# Patient Record
Sex: MALE | Race: BLACK OR AFRICAN AMERICAN | URBAN - METROPOLITAN AREA
[De-identification: names, ages, dates, MRNs, and addresses within clinical notes are randomized per-mention and may not be internally consistent; named-entity substitution may affect disease eponyms.]

---

## 2017-10-09 ENCOUNTER — OFFICE VISIT (OUTPATIENT)
Dept: URGENT CARE | Facility: URGENT CARE | Age: 52
End: 2017-10-09
Payer: COMMERCIAL

## 2017-10-09 ENCOUNTER — RADIANT APPOINTMENT (OUTPATIENT)
Dept: GENERAL RADIOLOGY | Facility: CLINIC | Age: 52
End: 2017-10-09
Attending: FAMILY MEDICINE
Payer: COMMERCIAL

## 2017-10-09 VITALS
DIASTOLIC BLOOD PRESSURE: 50 MMHG | RESPIRATION RATE: 20 BRPM | WEIGHT: 173 LBS | HEART RATE: 60 BPM | SYSTOLIC BLOOD PRESSURE: 100 MMHG | OXYGEN SATURATION: 99 % | HEIGHT: 69 IN | BODY MASS INDEX: 25.62 KG/M2

## 2017-10-09 DIAGNOSIS — S99.922A INJURY OF LEFT FOOT, INITIAL ENCOUNTER: Primary | ICD-10-CM

## 2017-10-09 PROCEDURE — 73630 X-RAY EXAM OF FOOT: CPT | Mod: LT

## 2017-10-09 PROCEDURE — 99213 OFFICE O/P EST LOW 20 MIN: CPT | Performed by: FAMILY MEDICINE

## 2017-10-09 RX ORDER — NAPROXEN 500 MG/1
500 TABLET ORAL 2 TIMES DAILY WITH MEALS
Qty: 14 TABLET | Refills: 0 | Status: SHIPPED | OUTPATIENT
Start: 2017-10-09 | End: 2017-10-16

## 2017-10-09 ASSESSMENT — PAIN SCALES - GENERAL: PAINLEVEL: EXTREME PAIN (8)

## 2017-10-09 NOTE — MR AVS SNAPSHOT
"              After Visit Summary   10/9/2017    Fernando Bui Jr.    MRN: [id number]           Patient Information     Date Of Birth          1965        Visit Information        Provider Department      10/9/2017 9:00 AM Cali Candelario,  [facility]        Today's Diagnoses     Injury of left foot, initial encounter    -  1       Follow-ups after your visit        Who to contact     If you have questions or need follow up information about today's clinic visit or your schedule please contact [facility] directly at [phone].  Normal or non-critical lab and imaging results will be communicated to you by Marblarhart, letter or phone within 4 business days after the clinic has received the results. If you do not hear from us within 7 days, please contact the clinic through Marblarhart or phone. If you have a critical or abnormal lab result, we will notify you by phone as soon as possible.  Submit refill requests through [employer] Commons or call your pharmacy and they will forward the refill request to us. Please allow 3 business days for your refill to be completed.          Additional Information About Your Visit        MyChart Information     [employer] lets you send messages to your doctor, view your test results, renew your prescriptions, schedule appointments and more. To sign up, go to [email or web address] . Click on \"Log in\" on the left side of the screen, which will take you to the Welcome page. Then click on \"Sign up Now\" on the right side of the page.     You will be asked to enter the access code listed below, as well as some personal information. Please follow the directions to create your username and password.     Your access code is: [email or web address]  Expires: 2018 10:02 AM     Your access code will  in 90 days. If you need help or a new code, please call your [place] clinic or [phone].        Care EveryWhere ID     This is your " "Care EveryWhere ID. This could be used by other organizations to access your [place] medical records  [id number]        Your Vitals Were     Pulse Respirations Height Pulse Oximetry BMI (Body Mass Index)       60 20 5' 9\" (1.753 m) 99% 25.55 kg/m2        Blood Pressure from Last 3 Encounters:   10/09/17 100/50   10/24/14 94/60   10/13/14 124/74    Weight from Last 3 Encounters:   10/09/17 173 lb (78.5 kg)   10/24/14 174 lb (78.9 kg)   10/13/14 174 lb 1.6 oz (79 kg)              We Performed the Following     XR Foot Left G/E 3 Views          Today's Medication Changes          These changes are accurate as of: 10/9/17 10:02 AM.  If you have any questions, ask your nurse or doctor.               Start taking these medicines.        Dose/Directions    naproxen 500 MG tablet   Commonly known as:  NAPROSYN   Used for:  Injury of left foot, initial encounter   Started by:  Cali Candelario DO        Dose:  500 mg   Take 1 tablet (500 mg) by mouth 2 times daily (with meals) for 7 days   Quantity:  14 tablet   Refills:  0            Where to get your medicines      These medications were sent to [facility] [address] [address]     Phone:  [phone]     naproxen 500 MG tablet                Primary Care Provider    None Specified       No primary provider on file.        Equal Access to Services     JOSÉ PLASCENCIA AH: Hadii rasta martinezo Sojuan, waaxda luqadaha, qaybta kaalmada adeegyada, mian yadav. So [facility] [phone].    ATENCIÓN: Si habla español, tiene a winston disposición servicios gratuitos de asistencia lingüística. Micah al [phone].    We comply with applicable federal civil rights laws and [place] laws. We do not discriminate on the basis of race, color, national origin, age, disability, sex, sexual orientation, or gender identity.            Thank you!     Thank you for choosing [facility] " [facility]  for your care. Our goal is always to provide you with excellent care. Hearing back from our patients is one way we can continue to improve our services. Please take a few minutes to complete the written survey that you may receive in the mail after your visit with us. Thank you!             Your Updated Medication List - Protect others around you: Learn how to safely use, store and throw away your medicines at www.disposemymeds.org.          This list is accurate as of: 10/9/17 10:02 AM.  Always use your most recent med list.                   Brand Name Dispense Instructions for use Diagnosis    naproxen 500 MG tablet    NAPROSYN    14 tablet    Take 1 tablet (500 mg) by mouth 2 times daily (with meals) for 7 days    Injury of left foot, initial encounter

## 2017-10-09 NOTE — PROGRESS NOTES
"SUBJECTIVE:  Chief Complaint   Patient presents with     Toe Injury     DOI thursday left big toe   .ident presents with a chief complaint of left foot.  The injury occurred 4 days ago.   The injury happened while while walking.   How: trauma:  immediate pain  The patient complained of moderate pain and has had decreased ROM.    Pain exacerbated by weight-bearing and movement    He treated it initially with ice.   This is the first time this type of injury has occurred to this patient.     Past Medical History:   Diagnosis Date     Hyperlipidemia LDL goal <160 9/5/2013     Tobacco abuse 9/16/2013       No past surgical history on file.    Family History   Problem Relation Age of Onset     DIABETES Maternal Grandmother      CANCER Father        Social History   Substance Use Topics     Smoking status: Current Every Day Smoker     Packs/day: 2.00     Types: Cigarettes     Smokeless tobacco: Not on file     Alcohol use Yes      Comment: social     ROSINTEGUMENTARY/SKIN: NEGATIVE for open wound/bleeding and NEGATIVE for bruising  MUSCULOSKELETAL: NEGATIVE for joint swelling, paresthesias, radicular pain    EXAM:/50 (BP Location: Right arm, Patient Position: Sitting, Cuff Size: Adult Regular)  Pulse 60  Resp 20  Ht 5' 9\" (1.753 m)  Wt 173 lb (78.5 kg)  SpO2 99%  BMI 25.55 kg/m2 Gen: healthy,alert,no distress  Extremity: foot has pain with palpation.   There is not compromise to the distal circulation.  Pulses are +2 and CRT is brisk.GENERAL APPEARANCE: healthy, alert and no distress  EXTREMITIES: peripheral pulses normal  SKIN: no suspicious lesions or rashes  NEURO: Normal strength and tone, sensory exam grossly normal, mentation intact and speech normal    Xray without acute findings, read by Cali Candelario D.O.      ICD-10-CM    1. Injury of left foot, initial encounter S99.922A XR Foot Left G/E 3 Views     naproxen (NAPROSYN) 500 MG tablet         RICE    "

## 2017-10-09 NOTE — NURSING NOTE
"Chief Complaint   Patient presents with     Toe Injury     DOI thursday left big toe       Initial /50 (BP Location: Right arm, Patient Position: Sitting, Cuff Size: Adult Regular)  Pulse 60  Resp 20  Ht 5' 9\" (1.753 m)  Wt 173 lb (78.5 kg)  SpO2 99%  BMI 25.55 kg/m2 Estimated body mass index is 25.55 kg/(m^2) as calculated from the following:    Height as of this encounter: 5' 9\" (1.753 m).    Weight as of this encounter: 173 lb (78.5 kg).  Medication Reconciliation: complete   Radha Agarwal, Medical Assistant      "

## 2017-10-26 ENCOUNTER — OFFICE VISIT (OUTPATIENT)
Dept: URGENT CARE | Facility: URGENT CARE | Age: 52
End: 2017-10-26
Payer: COMMERCIAL

## 2017-10-26 VITALS
SYSTOLIC BLOOD PRESSURE: 110 MMHG | WEIGHT: 176 LBS | HEART RATE: 56 BPM | RESPIRATION RATE: 16 BRPM | DIASTOLIC BLOOD PRESSURE: 70 MMHG | TEMPERATURE: 98.8 F | BODY MASS INDEX: 25.99 KG/M2

## 2017-10-26 DIAGNOSIS — L03.90 CELLULITIS, UNSPECIFIED CELLULITIS SITE: ICD-10-CM

## 2017-10-26 DIAGNOSIS — R21 RASH: Primary | ICD-10-CM

## 2017-10-26 PROCEDURE — 99213 OFFICE O/P EST LOW 20 MIN: CPT | Performed by: FAMILY MEDICINE

## 2017-10-26 RX ORDER — CEPHALEXIN 500 MG/1
500 CAPSULE ORAL 3 TIMES DAILY
Qty: 30 CAPSULE | Refills: 0 | Status: SHIPPED | OUTPATIENT
Start: 2017-10-26 | End: 2017-11-05

## 2017-10-26 RX ORDER — TRIAMCINOLONE ACETONIDE 1 MG/G
CREAM TOPICAL
Qty: 80 G | Refills: 0 | Status: SHIPPED | OUTPATIENT
Start: 2017-10-26 | End: 2017-11-09

## 2017-10-26 NOTE — MR AVS SNAPSHOT
"              After Visit Summary   10/26/2017    Fernando Bui Jr.    MRN: [id number]           Patient Information     Date Of Birth          1965        Visit Information        Provider Department      10/26/2017 12:45 PM Cali Candelario DO [facility]        Today's Diagnoses     Rash    -  1    Cellulitis, unspecified cellulitis site           Follow-ups after your visit        Who to contact     If you have questions or need follow up information about today's clinic visit or your schedule please contact [facility] directly at [phone].  Normal or non-critical lab and imaging results will be communicated to you by Twibingohart, letter or phone within 4 business days after the clinic has received the results. If you do not hear from us within 7 days, please contact the clinic through Twibingohart or phone. If you have a critical or abnormal lab result, we will notify you by phone as soon as possible.  Submit refill requests through ClubKviar or call your pharmacy and they will forward the refill request to us. Please allow 3 business days for your refill to be completed.          Additional Information About Your Visit        MyChart Information     ClubKviar lets you send messages to your doctor, view your test results, renew your prescriptions, schedule appointments and more. To sign up, go to [email or web address] . Click on \"Log in\" on the left side of the screen, which will take you to the Welcome page. Then click on \"Sign up Now\" on the right side of the page.     You will be asked to enter the access code listed below, as well as some personal information. Please follow the directions to create your username and password.     Your access code is: [email or web address]  Expires: 2018 10:02 AM     Your access code will  in 90 days. If you need help or a new code, please call your [place] clinic or [phone].        Care EveryWhere ID     " This is your Care EveryWhere ID. This could be used by other organizations to access your [place] medical records  [id number]        Your Vitals Were     Pulse Temperature Respirations BMI (Body Mass Index)          56 98.8  F (37.1  C) 16 25.99 kg/m2         Blood Pressure from Last 3 Encounters:   10/26/17 110/70   10/09/17 100/50   10/24/14 94/60    Weight from Last 3 Encounters:   10/26/17 176 lb (79.8 kg)   10/09/17 173 lb (78.5 kg)   10/24/14 174 lb (78.9 kg)              Today, you had the following     No orders found for display         Today's Medication Changes          These changes are accurate as of: 10/26/17  1:46 PM.  If you have any questions, ask your nurse or doctor.               Start taking these medicines.        Dose/Directions    cephALEXin 500 MG capsule   Commonly known as:  KEFLEX   Used for:  Cellulitis, unspecified cellulitis site   Started by:  Cali Candelario DO        Dose:  500 mg   Take 1 capsule (500 mg) by mouth 3 times daily for 10 days   Quantity:  30 capsule   Refills:  0       triamcinolone 0.1 % cream   Commonly known as:  KENALOG   Used for:  Rash   Started by:  Cali Candelario DO        Apply sparingly to affected area three times daily as needed   Quantity:  80 g   Refills:  0            Where to get your medicines      These medications were sent to [facility] [address] [address]     Phone:  [phone]     cephALEXin 500 MG capsule    triamcinolone 0.1 % cream                Primary Care Provider    Physician No Ref-Primary       NO REF-PRIMARY PHYSICIAN        Equal Access to Services     [facility] AH: Hadii rasta rahman hadasho Soomaali, waaxda luqadaha, qaybta kaalmada adeegyada, mian yadav. So [facility] [phone].    ATENCIÓN: Si habla español, tiene a winston disposición servicios gratuitos de asistencia lingüística. Llame al [phone].    We comply with  applicable federal civil rights laws and [place] laws. We do not discriminate on the basis of race, color, national origin, age, disability, sex, sexual orientation, or gender identity.            Thank you!     Thank you for choosing [facility]  for your care. Our goal is always to provide you with excellent care. Hearing back from our patients is one way we can continue to improve our services. Please take a few minutes to complete the written survey that you may receive in the mail after your visit with us. Thank you!             Your Updated Medication List - Protect others around you: Learn how to safely use, store and throw away your medicines at www.disposemymeds.org.          This list is accurate as of: 10/26/17  1:46 PM.  Always use your most recent med list.                   Brand Name Dispense Instructions for use Diagnosis    cephALEXin 500 MG capsule    KEFLEX    30 capsule    Take 1 capsule (500 mg) by mouth 3 times daily for 10 days    Cellulitis, unspecified cellulitis site       triamcinolone 0.1 % cream    KENALOG    80 g    Apply sparingly to affected area three times daily as needed    Rash

## 2017-10-26 NOTE — PROGRESS NOTES
SUBJECTIVE:Fernando Bui Jr. is a 52 year old male who presents to the clinic today for a rash.  Onset of rash was week(s) ago.   Rash is still present.   Location of the rash: generalized.  Associated symptoms include: itching.    Symptoms are moderate and rash seems to be worsening.  Therapies tried to improve the rash: otc.  Previous history of a similar rash? No  Recent exposure history: none known    Past Medical History:   Diagnosis Date     Hyperlipidemia LDL goal <160 9/5/2013     Tobacco abuse 9/16/2013     Allergies   Allergen Reactions     No Clinical Screening - See Comments      detergent     Social History   Substance Use Topics     Smoking status: Current Every Day Smoker     Packs/day: 2.00     Types: Cigarettes     Smokeless tobacco: Never Used      Comment: 8 cigarettes a day     Alcohol use Yes      Comment: social       ROS:CONSTITUTIONAL:NEGATIVE for fever, chills, change in weight    EXAM: VITALS: /70  Pulse 56  Temp 98.8  F (37.1  C)  Resp 16  Wt 176 lb (79.8 kg)  BMI 25.99 kg/m2  General:healthy,alert,no distress    Location: generalized     Distribution: diffuse/patchy     Lesion grouping: bilateral     Lesion type: macular     Color: skin color with no other findingsPERTINENT EXAM: GENERAL APPEARANCE: healthy, alert and no distress      ICD-10-CM    1. Rash R21 triamcinolone (KENALOG) 0.1 % cream   2. Cellulitis, unspecified cellulitis site L03.90 cephALEXin (KEFLEX) 500 MG capsule       Follow-up with primary clinic if not improving

## 2017-10-26 NOTE — NURSING NOTE
"Chief Complaint   Patient presents with     Derm Problem     Pt c/o bumps on his hands and abdomen X 2 weeks. Pt reports itching.     Urgent Care       Initial /70  Pulse 56  Temp 98.8  F (37.1  C)  Resp 16  Wt 176 lb (79.8 kg)  BMI 25.99 kg/m2 Estimated body mass index is 25.99 kg/(m^2) as calculated from the following:    Height as of 10/9/17: 5' 9\" (1.753 m).    Weight as of this encounter: 176 lb (79.8 kg).  Medication Reconciliation: complete    "

## 2017-11-29 ENCOUNTER — OFFICE VISIT (OUTPATIENT)
Dept: URGENT CARE | Facility: URGENT CARE | Age: 52
End: 2017-11-29
Payer: COMMERCIAL

## 2017-11-29 VITALS
SYSTOLIC BLOOD PRESSURE: 112 MMHG | HEART RATE: 69 BPM | TEMPERATURE: 98 F | BODY MASS INDEX: 25.84 KG/M2 | DIASTOLIC BLOOD PRESSURE: 80 MMHG | WEIGHT: 175 LBS

## 2017-11-29 DIAGNOSIS — R21 RASH: Primary | ICD-10-CM

## 2017-11-29 PROCEDURE — 99213 OFFICE O/P EST LOW 20 MIN: CPT | Performed by: FAMILY MEDICINE

## 2017-11-29 RX ORDER — PERMETHRIN 50 MG/G
CREAM TOPICAL
Qty: 60 G | Refills: 1 | Status: SHIPPED | OUTPATIENT
Start: 2017-11-29 | End: 2017-12-29

## 2017-11-29 NOTE — PATIENT INSTRUCTIONS
Scabies  Scabies is a skin infection. It is caused by a tiny parasitic insect, or mite, that is too small to see directly. It can be seen under a microscope, but it is usually recognized only by the rash and symptoms it causes. This can make it hard to diagnose since the signs and symptoms can be similar to other diseases.  The scabies mite tunnels under the skin. It creates a small burrow, where it leaves its eggs. These eggs castellanos and grow into adults. They then create new burrows over the next 1 to 2 weeks. The mites die in about 4 to 6 weeks. The rash and itching are caused by an allergic reaction to the scabies saliva or feces.  Scabies is highly contagious. It is spread by direct skin contact. It is easily spread by close personal contact, sexual contact, or by sharing bed linens or clothing used by an infected person.  It may take 4 to 6 weeks for symptoms to appear after being exposed. Everyone living in the house with you, as well as your sexual partners, should be treated at the same time. After the first treatment, you will no longer be contagious. You may return to work, school or .  Home care    Machine wash in hot water all sheets, towels, pillowcases, underwear, pajamas, and any other clothing you have worn lately. Use the hot cycle of a dryer or use a hot iron to sterilize.    Seal anything that is hard to wash in a plastic trash bag for 4 days. This includes coats, jackets, blankets, and bedspreads. (The insects die after 3 days off the human body.)  Medicines  Scabicides  Medicines used to treat scabies are called scabicides. These are creams that kill the scabies mites. A prescription is needed. When using these medicines:    Always follow instructions provided by your healthcare provider and pharmacist. Also follow the printed instructions that come with the medicine.    Talk with your provider about precautions to take when using these medicines.    Use the cream on your body when your  skin is cool and dry. Don t use it after a hot shower or bath.    Usually the cream is put on your whole body. This means from your chin all the way down to your toes. Scabies does not usually affect an adult s head. So cream is not needed there. For children, discuss this with your child s provider.    Leave the cream or lotion on for the recommended amount of time. This is usually 8 to 12 hours.    Don t leave cream or lotion on your skin longer than directed. Don t use more than recommended.    Clean clothes should be worn after the treatment.    If you wash your hands after using the cream, you will need to reapply the cream to your hands.    If you are breastfeeding, wash off your nipples before feeding. Then reapply the cream after breastfeeding.    For babies or infants, put mittens on their hands. This will stop them from licking the cream or lotion. It will also stop them from scratching themselves because of the itching.  Other medicines    An oral medicine called ivermectin may be prescribed for severe cases. It may also be used if you can t apply creams.    Itching may cause the most discomfort. If large areas of your skin are affected, over-the-counter antihistamines may be used to reduce itching. Or you may be given a prescription antihistamine. Some of these medicines may make you sleepy. They are best used at bedtime. Antihistamines that don t make you sleepy can be used during the day. Note: Don t use medicine that has diphenhydramine if you have glaucoma, or if you are a man who has trouble urinating due to an enlarged prostate.    If you were given antibiotics due to a bacterial infection, take them until they are finished. It is important to finish the antibiotics even if the wound looks better. This is to make sure the infection has cleared.  Follow-up care  Follow up with your healthcare provider, or as advised. Call your provider if your symptoms don t improve after 1 week, or if new burrows  or rashes appear.  When to seek medical advice  Call your healthcare provider right away if any of these occur:    Yellow-brown crusts or drainage from the sores    Other signs of infection, including increasing redness, swelling, pain, or pus    Fever of 100.4 F (38 C) or higher, or as directed by your provider  Date Last Reviewed: 8/1/2016 2000-2017 [employer]. [address] [place] [address]. All rights reserved. This information is not intended as a substitute for professional medical care. Always follow your healthcare professional's instructions.

## 2017-11-29 NOTE — NURSING NOTE
"Chief Complaint   Patient presents with     Derm Problem     itchy rash over various parts of body for a few weeks.       Initial /80  Pulse 69  Temp 98  F (36.7  C) (Oral)  Wt 175 lb (79.4 kg)  BMI 25.84 kg/m2 Estimated body mass index is 25.84 kg/(m^2) as calculated from the following:    Height as of 10/9/17: 5' 9\" (1.753 m).    Weight as of this encounter: 175 lb (79.4 kg).  Medication Reconciliation: complete    "

## 2017-11-29 NOTE — PROGRESS NOTES
SUBJECTIVE:Fernando Bui Jr. is a 52 year old male who presents to the clinic today for a rash.  Onset of rash was 1 week(s) ago.   Rash is still present.   Location of the rash: generalized.  Associated symptoms include: itching.    Symptoms are moderate and rash seems to be worsening.  Therapies tried to improve the rash: none.  Previous history of a similar rash? No  Recent exposure history: none known    Past Medical History:   Diagnosis Date     Hyperlipidemia LDL goal <160 9/5/2013     Tobacco abuse 9/16/2013     Allergies   Allergen Reactions     No Clinical Screening - See Comments      detergent     Social History   Substance Use Topics     Smoking status: Current Every Day Smoker     Packs/day: 2.00     Types: Cigarettes     Smokeless tobacco: Never Used      Comment: 8 cigarettes a day     Alcohol use Yes      Comment: social       ROS:CONSTITUTIONAL:NEGATIVE for fever, chills, change in weight    EXAM: VITALS: /80  Pulse 69  Temp 98  F (36.7  C) (Oral)  Wt 175 lb (79.4 kg)  BMI 25.84 kg/m2  General:healthy,alert,no distress    Location: generalized     Distribution: diffuse/patchy     Lesion grouping: bilateral     Lesion type: macular     Color: red with no other findingsPERTINENT EXAM: GENERAL APPEARANCE: healthy, alert and no distress      ICD-10-CM    1. Rash R21 permethrin (ELIMITE) 5 % cream     DERMATOLOGY REFERRAL       Follow-up with primary clinic if not improving

## 2017-11-29 NOTE — MR AVS SNAPSHOT
After Visit Summary   11/29/2017    Fernando Bui Jr.    MRN: [id number]           Patient Information     Date Of Birth          1965        Visit Information        Provider Department      11/29/2017 9:15 AM Cali Candelario DO [facility]        Today's Diagnoses     Rash    -  1      Care Instructions      Scabies  Scabies is a skin infection. It is caused by a tiny parasitic insect, or mite, that is too small to see directly. It can be seen under a microscope, but it is usually recognized only by the rash and symptoms it causes. This can make it hard to diagnose since the signs and symptoms can be similar to other diseases.  The scabies mite tunnels under the skin. It creates a small burrow, where it leaves its eggs. These eggs castellanos and grow into adults. They then create new burrows over the next 1 to 2 weeks. The mites die in about 4 to 6 weeks. The rash and itching are caused by an allergic reaction to the scabies saliva or feces.  Scabies is highly contagious. It is spread by direct skin contact. It is easily spread by close personal contact, sexual contact, or by sharing bed linens or clothing used by an infected person.  It may take 4 to 6 weeks for symptoms to appear after being exposed. Everyone living in the house with you, as well as your sexual partners, should be treated at the same time. After the first treatment, you will no longer be contagious. You may return to work, school or .  Home care    Machine wash in hot water all sheets, towels, pillowcases, underwear, pajamas, and any other clothing you have worn lately. Use the hot cycle of a dryer or use a hot iron to sterilize.    Seal anything that is hard to wash in a plastic trash bag for 4 days. This includes coats, jackets, blankets, and bedspreads. (The insects die after 3 days off the human body.)  Medicines  Scabicides  Medicines used to treat scabies are called scabicides.  These are creams that kill the scabies mites. A prescription is needed. When using these medicines:    Always follow instructions provided by your healthcare provider and pharmacist. Also follow the printed instructions that come with the medicine.    Talk with your provider about precautions to take when using these medicines.    Use the cream on your body when your skin is cool and dry. Don t use it after a hot shower or bath.    Usually the cream is put on your whole body. This means from your chin all the way down to your toes. Scabies does not usually affect an adult s head. So cream is not needed there. For children, discuss this with your child s provider.    Leave the cream or lotion on for the recommended amount of time. This is usually 8 to 12 hours.    Don t leave cream or lotion on your skin longer than directed. Don t use more than recommended.    Clean clothes should be worn after the treatment.    If you wash your hands after using the cream, you will need to reapply the cream to your hands.    If you are breastfeeding, wash off your nipples before feeding. Then reapply the cream after breastfeeding.    For babies or infants, put mittens on their hands. This will stop them from licking the cream or lotion. It will also stop them from scratching themselves because of the itching.  Other medicines    An oral medicine called ivermectin may be prescribed for severe cases. It may also be used if you can t apply creams.    Itching may cause the most discomfort. If large areas of your skin are affected, over-the-counter antihistamines may be used to reduce itching. Or you may be given a prescription antihistamine. Some of these medicines may make you sleepy. They are best used at bedtime. Antihistamines that don t make you sleepy can be used during the day. Note: Don t use medicine that has diphenhydramine if you have glaucoma, or if you are a man who has trouble urinating due to an enlarged prostate.    If  you were given antibiotics due to a bacterial infection, take them until they are finished. It is important to finish the antibiotics even if the wound looks better. This is to make sure the infection has cleared.  Follow-up care  Follow up with your healthcare provider, or as advised. Call your provider if your symptoms don t improve after 1 week, or if new burrows or rashes appear.  When to seek medical advice  Call your healthcare provider right away if any of these occur:    Yellow-brown crusts or drainage from the sores    Other signs of infection, including increasing redness, swelling, pain, or pus    Fever of 100.4 F (38 C) or higher, or as directed by your provider  Date Last Reviewed: 8/1/2016 2000-2017 [employer]. [address] [place] [address], [place] [address]. All rights reserved. This information is not intended as a substitute for professional medical care. Always follow your healthcare professional's instructions.                Follow-ups after your visit        Additional Services     DERMATOLOGY REFERRAL       Your provider has referred you to: [facility]: [facility] [phone]   [email or web address]  UM: D[facility]ology Clinic [facility] [phone]   [email or web address]  N: [facility] [phone]   [email or web address]  FHN: [facility] [place] [phone]  [email or web address]  FHN: [facility] [phone]   [email or web address]  [place] [phone]   [email or web address]  FHN: [facility] [place] [phone]   [email or web address]  N: [facility] [phone]   [email or web address]    Please be aware that coverage of these services is subject to the terms and limitations of your health insurance plan.  Call  "member services at your health plan with any benefit or coverage questions.      Please bring the following with you to your appointment:    (1) Any X-Rays, CTs or MRIs which have been performed.  Contact the facility where they were done to arrange for  prior to your scheduled appointment.    (2) List of current medications  (3) This referral request   (4) Any documents/labs given to you for this referral                  Who to contact     If you have questions or need follow up information about today's clinic visit or your schedule please contact [facility] directly at [phone].  Normal or non-critical lab and imaging results will be communicated to you by MyChart, letter or phone within 4 business days after the clinic has received the results. If you do not hear from us within 7 days, please contact the clinic through [employer] or phone. If you have a critical or abnormal lab result, we will notify you by phone as soon as possible.  Submit refill requests through [employer] or call your pharmacy and they will forward the refill request to us. Please allow 3 business days for your refill to be completed.          Additional Information About Your Visit        MyChart Information     [employer] lets you send messages to your doctor, view your test results, renew your prescriptions, schedule appointments and more. To sign up, go to [email or web address] . Click on \"Log in\" on the left side of the screen, which will take you to the Welcome page. Then click on \"Sign up Now\" on the right side of the page.     You will be asked to enter the access code listed below, as well as some personal information. Please follow the directions to create your username and password.     Your access code is: [email or web address]  Expires: 2018  9:02 AM     Your access code will  in 90 days. If you need help or a new code, please call your [place] clinic or [phone].        Care EveryWhere ID "     This is your Care EveryWhere ID. This could be used by other organizations to access your [facility] medical records  [id number]        Your Vitals Were     Pulse Temperature BMI (Body Mass Index)             69 98  F (36.7  C) (Oral) 25.84 kg/m2          Blood Pressure from Last 3 Encounters:   11/29/17 112/80   10/26/17 110/70   10/09/17 100/50    Weight from Last 3 Encounters:   11/29/17 175 lb (79.4 kg)   10/26/17 176 lb (79.8 kg)   10/09/17 173 lb (78.5 kg)              We Performed the Following     DERMATOLOGY REFERRAL          Today's Medication Changes          These changes are accurate as of: 11/29/17  9:47 AM.  If you have any questions, ask your nurse or doctor.               Start taking these medicines.        Dose/Directions    permethrin 5 % cream   Commonly known as:  ELIMITE   Used for:  Rash   Started by:  Cali Candelario, DO        Apply cream from head to toe (except the face); leave on for 8-14 hours then wash off with water; reapply in 1 week if live mites appear.   Quantity:  60 g   Refills:  1            Where to get your medicines      These medications were sent to [facility] [place] [address]     Phone:  [phone]     permethrin 5 % cream                Primary Care Provider Fax #    Physician No Ref-Primary [phone]       No address on file        Equal Access to Services     JOSÉ PLASCENCIA AH: Hadii rasta martinezo Sojuan, waaxda luqadaha, qaybta kaalmada adeegyada, waxobdulia kavita yadav. So [facility] [phone].    ATENCIÓN: Si habla español, tiene a winston disposición servicios gratuitos de asistencia lingüística. Llame al [phone].    We comply with applicable federal civil rights laws and [place] laws. We do not discriminate on the basis of race, color, national origin, age, disability, sex, sexual orientation, or gender identity.            Thank you!     Thank you for choosing  [facility]  for your care. Our goal is always to provide you with excellent care. Hearing back from our patients is one way we can continue to improve our services. Please take a few minutes to complete the written survey that you may receive in the mail after your visit with us. Thank you!             Your Updated Medication List - Protect others around you: Learn how to safely use, store and throw away your medicines at www.disposemymeds.org.          This list is accurate as of: 11/29/17  9:47 AM.  Always use your most recent med list.                   Brand Name Dispense Instructions for use Diagnosis    permethrin 5 % cream    ELIMITE    60 g    Apply cream from head to toe (except the face); leave on for 8-14 hours then wash off with water; reapply in 1 week if live mites appear.    Rash